# Patient Record
Sex: FEMALE | URBAN - METROPOLITAN AREA
[De-identification: names, ages, dates, MRNs, and addresses within clinical notes are randomized per-mention and may not be internally consistent; named-entity substitution may affect disease eponyms.]

---

## 2021-01-20 ENCOUNTER — OFFICE VISIT (OUTPATIENT)
Dept: URBAN - METROPOLITAN AREA SURGERY CENTER 19 | Facility: SURGERY CENTER | Age: 56
End: 2021-01-20
Payer: COMMERCIAL

## 2021-01-20 DIAGNOSIS — Z80.0 FAMILY HISTORY MALIGNANT NEOPLASM OF BILIARY TRACT: ICD-10-CM

## 2021-01-20 DIAGNOSIS — K63.5 BENIGN COLON POLYP: ICD-10-CM

## 2021-01-20 DIAGNOSIS — Z86.010 H/O ADENOMATOUS POLYP OF COLON: ICD-10-CM

## 2021-01-20 PROCEDURE — 45380 COLONOSCOPY AND BIOPSY: CPT | Performed by: INTERNAL MEDICINE

## 2021-01-20 PROCEDURE — G8907 PT DOC NO EVENTS ON DISCHARG: HCPCS | Performed by: INTERNAL MEDICINE

## 2024-07-09 ENCOUNTER — TELEPHONE ENCOUNTER (OUTPATIENT)
Dept: URBAN - METROPOLITAN AREA CLINIC 80 | Facility: CLINIC | Age: 59
End: 2024-07-09

## 2024-07-15 ENCOUNTER — DASHBOARD ENCOUNTERS (OUTPATIENT)
Age: 59
End: 2024-07-15

## 2024-07-15 ENCOUNTER — OFFICE VISIT (OUTPATIENT)
Dept: URBAN - METROPOLITAN AREA CLINIC 80 | Facility: CLINIC | Age: 59
End: 2024-07-15
Payer: COMMERCIAL

## 2024-07-15 VITALS
HEART RATE: 72 BPM | HEIGHT: 68 IN | SYSTOLIC BLOOD PRESSURE: 130 MMHG | BODY MASS INDEX: 31.37 KG/M2 | DIASTOLIC BLOOD PRESSURE: 80 MMHG | WEIGHT: 207 LBS | TEMPERATURE: 98.2 F

## 2024-07-15 DIAGNOSIS — R10.84 ABDOMINAL PAIN, GENERALIZED: ICD-10-CM

## 2024-07-15 DIAGNOSIS — R19.7 DIARRHEA, UNSPECIFIED TYPE: ICD-10-CM

## 2024-07-15 DIAGNOSIS — Z86.010 PERSONAL HISTORY OF COLONIC POLYPS: ICD-10-CM

## 2024-07-15 DIAGNOSIS — K92.1 BLOOD IN STOOL: ICD-10-CM

## 2024-07-15 PROBLEM — 428283002: Status: ACTIVE | Noted: 2024-07-15

## 2024-07-15 PROBLEM — 40739000: Status: ACTIVE | Noted: 2024-07-15

## 2024-07-15 PROCEDURE — 99204 OFFICE O/P NEW MOD 45 MIN: CPT

## 2024-07-15 RX ORDER — ESTRADIOL 0.1 MG/D
PATCH, EXTENDED RELEASE TRANSDERMAL
Qty: 24 EACH | Status: ACTIVE | COMMUNITY

## 2024-07-15 RX ORDER — ESTRADIOL 0.1 MG/D
PLACE 1 PATCH ONTO THE SKIN 2 TIMES A WEEK PATCH, EXTENDED RELEASE TRANSDERMAL
Qty: 24 EACH | Refills: 2 | Status: ACTIVE | COMMUNITY

## 2024-07-15 RX ORDER — HYDROCORTISONE ACETATE 25 MG/1
1 SUPPOSITORY SUPPOSITORY RECTAL ONCE A DAY
Qty: 14 | Refills: 0 | OUTPATIENT
Start: 2024-07-15 | End: 2024-07-29

## 2024-07-15 RX ORDER — VALACYCLOVIR 500 MG/1
TABLET, FILM COATED ORAL
Qty: 30 TABLET | Status: ACTIVE | COMMUNITY

## 2024-07-15 NOTE — HPI-TODAY'S VISIT:
Today's Visit::  Patient presents c/o a change in bowel habit with bowel movements being liquid in nature with increased mucus. Pt notes that she has been having these issues for the past few months.  The patient elicits having diarrhea. Patient has had how many bowel movements a day: pt can have 2-3 BM a day, it can take pt up to 3-4 times in the bathroom to get a complete evacuation, stools are bss 6-7 when they occur.  It is relieved by: nothing  It is aggravated by: nothing, eating doesn't make these symptoms worse  Associated symptoms: +occasional nausea which pt notes may be related to the "heat", occasional vomiting related to inner ear condition that is being treated for, denies fever/chills, pt notes increased urgency to have a BM.  Recent antibiotics: last time pt took abx was in spring.  Recent travel outside of [place]:  denies  Recent sick contacts: denies  Current stress: new stress associated with building a new house  Recent medication changes: denies  Recent dietary changes: denies  Recent weight changes: denies unintentional weight loss  Previous work up, labs, imaging: denies  Last colonoscopy date:. 2021 with Dr. Brown showing one diminutive polyp and internal hemorrhoids, path showing polypoid fragment of colonic mucosa with lymphoid aggregate. Repeat in 5 years advised.  Pt notes lower abdominal pain prior to a BM. which is relieved by having a BM. Pain is described as a crampy sensation that only occurs when pt needs to have a BM.   Prior to a few months ago, pt notes having years of chronic loose stools, but this symptom has worsened.   pt with a history of hemorrhoids, pt notes almost daily BRB in the toilet and on the tissue x years.  Father  of colon cancer. Sister have had colon polyps. ?mother with colon cancer. Patient denies heartburn, current abdominal pain, melena, unintentional weight loss, and loss of appetite. Patient denies blood thinner use, pacemaker/defibrillator, diabetes, kidney disease, and home O2. Denies fever/chills  +dysphagia with solids, not with liquids. She has noticed this symptom for the last 1-2 years. denies heartburn feels like food can rarely get "caught" in esophagus

## 2024-07-15 NOTE — PHYSICAL EXAM GASTROINTESTINAL
Abdomen , soft, nontender, nondistended , no guarding or rigidity , no masses palpable , normal bowel sounds, negative Martin's sign, negative Rovsing's, negative psoas and obturator signs, negative CVA tenderness bilaterally Liver and Spleen , no hepatosplenomegaly Rectal , deferred

## 2024-07-16 ENCOUNTER — TELEPHONE ENCOUNTER (OUTPATIENT)
Dept: URBAN - METROPOLITAN AREA CLINIC 80 | Facility: CLINIC | Age: 59
End: 2024-07-16

## 2024-07-22 LAB
A/G RATIO: 1.7
ABSOLUTE BASOPHILS: 54
ABSOLUTE EOSINOPHILS: 123
ABSOLUTE LYMPHOCYTES: 1802
ABSOLUTE MONOCYTES: 593
ABSOLUTE NEUTROPHILS: 5128
ALBUMIN: 4
ALKALINE PHOSPHATASE: 72
ALT (SGPT): 17
AST (SGOT): 14
BASOPHILS: 0.7
BILIRUBIN, TOTAL: 0.6
BUN/CREATININE RATIO: (no result)
BUN: 13
C-REACTIVE PROTEIN, QUANT: 4
CALCIUM: 9.2
CARBON DIOXIDE, TOTAL: 26
CHLORIDE: 105
CREATININE: 0.7
EGFR: 99
EOSINOPHILS: 1.6
GLOBULIN, TOTAL: 2.4
GLUCOSE: 102
HEMATOCRIT: 39.8
HEMOGLOBIN: 13.4
IMMUNOGLOBULIN A, QN, SERUM: 205
INTERPRETATION: (no result)
LYMPHOCYTES: 23.4
MCH: 30.6
MCHC: 33.7
MCV: 90.9
MONOCYTES: 7.7
MPV: 10.3
NEUTROPHILS: 66.6
PLATELET COUNT: 258
POTASSIUM: 4.1
PROTEIN, TOTAL: 6.4
RDW: 12.2
RED BLOOD CELL COUNT: 4.38
SODIUM: 140
T-TRANSGLUTAMINASE (TTG) IGA: <1
TSH W/REFLEX TO FT4: 0.95
WHITE BLOOD CELL COUNT: 7.7

## 2024-11-06 ENCOUNTER — CLAIMS CREATED FROM THE CLAIM WINDOW (OUTPATIENT)
Dept: URBAN - METROPOLITAN AREA SURGERY CENTER 19 | Facility: SURGERY CENTER | Age: 59
End: 2024-11-06
Payer: COMMERCIAL

## 2024-11-06 DIAGNOSIS — K63.89 OTHER SPECIFIED DISEASES OF INTESTINE: ICD-10-CM

## 2024-11-06 DIAGNOSIS — Z12.11 COLON CANCER SCREENING: ICD-10-CM

## 2024-11-06 DIAGNOSIS — K64.8 OTHER HEMORRHOIDS: ICD-10-CM

## 2024-11-06 DIAGNOSIS — R19.4 CHANGE IN BOWEL HABITS: ICD-10-CM

## 2024-11-06 DIAGNOSIS — R19.7 CHRONIC DIARRHEA: ICD-10-CM

## 2024-11-06 DIAGNOSIS — Z80.0 FAMILY HISTORY OF COLON CANCER: ICD-10-CM

## 2024-11-06 DIAGNOSIS — Z86.0100 PERSONAL HISTORY OF COLON POLYPS, UNSPECIFIED: ICD-10-CM

## 2024-11-06 DIAGNOSIS — Z86.0100 PERSONAL HISTORY OF COLONIC POLYPS: ICD-10-CM

## 2024-11-06 PROCEDURE — 00812 ANES LWR INTST SCR COLSC: CPT | Performed by: NURSE ANESTHETIST, CERTIFIED REGISTERED

## 2024-11-06 PROCEDURE — 45380 COLONOSCOPY AND BIOPSY: CPT | Performed by: INTERNAL MEDICINE

## 2024-11-06 RX ORDER — ESTRADIOL 0.1 MG/D
PLACE 1 PATCH ONTO THE SKIN 2 TIMES A WEEK PATCH, EXTENDED RELEASE TRANSDERMAL
Qty: 24 EACH | Refills: 2 | Status: ACTIVE | COMMUNITY

## 2024-11-06 RX ORDER — ESTRADIOL 0.1 MG/D
PATCH, EXTENDED RELEASE TRANSDERMAL
Qty: 24 EACH | Status: ACTIVE | COMMUNITY

## 2024-11-06 RX ORDER — VALACYCLOVIR 500 MG/1
TABLET, FILM COATED ORAL
Qty: 30 TABLET | Status: ACTIVE | COMMUNITY